# Patient Record
Sex: FEMALE | Race: WHITE | NOT HISPANIC OR LATINO | Employment: OTHER | URBAN - METROPOLITAN AREA
[De-identification: names, ages, dates, MRNs, and addresses within clinical notes are randomized per-mention and may not be internally consistent; named-entity substitution may affect disease eponyms.]

---

## 2023-06-01 ENCOUNTER — OFFICE VISIT (OUTPATIENT)
Dept: PRIMARY CARE | Facility: CLINIC | Age: 68
End: 2023-06-01
Payer: MEDICARE

## 2023-06-01 VITALS
HEIGHT: 68 IN | BODY MASS INDEX: 18.94 KG/M2 | DIASTOLIC BLOOD PRESSURE: 70 MMHG | SYSTOLIC BLOOD PRESSURE: 170 MMHG | WEIGHT: 125 LBS | TEMPERATURE: 97.5 F

## 2023-06-01 DIAGNOSIS — Z12.31 ENCOUNTER FOR SCREENING MAMMOGRAM FOR BREAST CANCER: ICD-10-CM

## 2023-06-01 DIAGNOSIS — Z00.00 ROUTINE GENERAL MEDICAL EXAMINATION AT HEALTH CARE FACILITY: Primary | ICD-10-CM

## 2023-06-01 DIAGNOSIS — Z78.0 MENOPAUSE: ICD-10-CM

## 2023-06-01 LAB
ALANINE AMINOTRANSFERASE (SGPT) (U/L) IN SER/PLAS: 17 U/L (ref 7–45)
ALBUMIN (G/DL) IN SER/PLAS: 4.2 G/DL (ref 3.4–5)
ALKALINE PHOSPHATASE (U/L) IN SER/PLAS: 68 U/L (ref 33–136)
ANION GAP IN SER/PLAS: 12 MMOL/L (ref 10–20)
ASPARTATE AMINOTRANSFERASE (SGOT) (U/L) IN SER/PLAS: 25 U/L (ref 9–39)
BILIRUBIN TOTAL (MG/DL) IN SER/PLAS: 0.7 MG/DL (ref 0–1.2)
CALCIUM (MG/DL) IN SER/PLAS: 9.6 MG/DL (ref 8.6–10.6)
CARBON DIOXIDE, TOTAL (MMOL/L) IN SER/PLAS: 29 MMOL/L (ref 21–32)
CHLORIDE (MMOL/L) IN SER/PLAS: 104 MMOL/L (ref 98–107)
CHOLESTEROL (MG/DL) IN SER/PLAS: 254 MG/DL (ref 0–199)
CHOLESTEROL IN HDL (MG/DL) IN SER/PLAS: 87.2 MG/DL
CHOLESTEROL/HDL RATIO: 2.9
CREATININE (MG/DL) IN SER/PLAS: 0.76 MG/DL (ref 0.5–1.05)
ESTIMATED AVERAGE GLUCOSE FOR HBA1C: 111 MG/DL
GFR FEMALE: 84 ML/MIN/1.73M2
GLUCOSE (MG/DL) IN SER/PLAS: 90 MG/DL (ref 74–99)
HEMOGLOBIN A1C/HEMOGLOBIN TOTAL IN BLOOD: 5.5 %
LDL: 152 MG/DL (ref 0–99)
POTASSIUM (MMOL/L) IN SER/PLAS: 4.1 MMOL/L (ref 3.5–5.3)
PROTEIN TOTAL: 7.1 G/DL (ref 6.4–8.2)
SODIUM (MMOL/L) IN SER/PLAS: 141 MMOL/L (ref 136–145)
TRIGLYCERIDE (MG/DL) IN SER/PLAS: 72 MG/DL (ref 0–149)
UREA NITROGEN (MG/DL) IN SER/PLAS: 13 MG/DL (ref 6–23)
VLDL: 14 MG/DL (ref 0–40)

## 2023-06-01 PROCEDURE — 1170F FXNL STATUS ASSESSED: CPT | Performed by: FAMILY MEDICINE

## 2023-06-01 PROCEDURE — G0439 PPPS, SUBSEQ VISIT: HCPCS | Performed by: FAMILY MEDICINE

## 2023-06-01 PROCEDURE — 83036 HEMOGLOBIN GLYCOSYLATED A1C: CPT

## 2023-06-01 PROCEDURE — 80061 LIPID PANEL: CPT

## 2023-06-01 PROCEDURE — 1036F TOBACCO NON-USER: CPT | Performed by: FAMILY MEDICINE

## 2023-06-01 PROCEDURE — 84443 ASSAY THYROID STIM HORMONE: CPT

## 2023-06-01 PROCEDURE — 80053 COMPREHEN METABOLIC PANEL: CPT

## 2023-06-01 PROCEDURE — 99397 PER PM REEVAL EST PAT 65+ YR: CPT | Performed by: FAMILY MEDICINE

## 2023-06-01 ASSESSMENT — ENCOUNTER SYMPTOMS
CONSTIPATION: 0
SHORTNESS OF BREATH: 0
LOSS OF SENSATION IN FEET: 0
VOICE CHANGE: 0
COUGH: 0
MYALGIAS: 0
SLEEP DISTURBANCE: 0
FEVER: 0
ABDOMINAL PAIN: 0
NAUSEA: 0
HEMATURIA: 0
ARTHRALGIAS: 0
WEAKNESS: 0
ROS SKIN COMMENTS: NO MOLES GROWING OR CHANGING.
WOUND: 0
VOMITING: 0
FATIGUE: 0
DEPRESSION: 0
DYSPHORIC MOOD: 0
FREQUENCY: 0
DIARRHEA: 0
RHINORRHEA: 0
PALPITATIONS: 0
OCCASIONAL FEELINGS OF UNSTEADINESS: 0
ADENOPATHY: 0
BACK PAIN: 0
NERVOUS/ANXIOUS: 0
HEADACHES: 0
WHEEZING: 0
SINUS PRESSURE: 0
BLOOD IN STOOL: 0
SORE THROAT: 0
NUMBNESS: 0

## 2023-06-01 ASSESSMENT — ACTIVITIES OF DAILY LIVING (ADL)
DOING_HOUSEWORK: INDEPENDENT
MANAGING_FINANCES: INDEPENDENT
GROCERY_SHOPPING: INDEPENDENT
BATHING: INDEPENDENT
DRESSING: INDEPENDENT
TAKING_MEDICATION: INDEPENDENT

## 2023-06-01 ASSESSMENT — PATIENT HEALTH QUESTIONNAIRE - PHQ9
1. LITTLE INTEREST OR PLEASURE IN DOING THINGS: NOT AT ALL
2. FEELING DOWN, DEPRESSED OR HOPELESS: NOT AT ALL
SUM OF ALL RESPONSES TO PHQ9 QUESTIONS 1 AND 2: 0

## 2023-06-01 NOTE — PROGRESS NOTES
"Subjective   Reason for Visit: Jeana Gatica is an 67 y.o. female here for a Medicare Wellness visit.     Past Medical, Surgical, and Family History reviewed and updated in chart.         HPI  Had a fall six weeks ago.  Tripped over a in the garden.  She broke her left wrist.  Seeing Dr Sinclair.  Cast was just removed yesterday.  Radial head fracture.  In a splint.  This occurred six weeks ago.    She states she had a colonoscopy nine years ago.  It was normal  She was told to come back in ten years.  Patient Care Team:  Aldo Rooney DO as PCP - General  Aldo Rooney DO as PCP - [employer]vantage PCP   Has advanced directives.  Has brought them in for the chart.  Review of Systems   Constitutional:  Negative for fatigue and fever.   HENT:  Negative for rhinorrhea, sinus pressure, sore throat and voice change.    Respiratory:  Negative for cough, shortness of breath and wheezing.    Cardiovascular:  Negative for chest pain, palpitations and leg swelling.   Gastrointestinal:  Negative for abdominal pain, blood in stool, constipation, diarrhea, nausea and vomiting.   Genitourinary:  Negative for frequency, hematuria and vaginal bleeding.   Musculoskeletal:  Negative for arthralgias, back pain and myalgias.   Skin:  Negative for rash and wound.        No moles growing or changing.   Neurological:  Negative for syncope, weakness, numbness and headaches.   Hematological:  Negative for adenopathy.   Psychiatric/Behavioral:  Negative for dysphoric mood, self-injury, sleep disturbance and suicidal ideas. The patient is not nervous/anxious.         No anhedonia.       Objective   Vitals:  /70 (BP Location: Left arm, Patient Position: Sitting)   Temp 36.4 °C (97.5 °F)   Ht 1.727 m (5' 8\")   Wt 56.7 kg (125 lb)   BMI 19.01 kg/m²       Physical Exam  Vitals reviewed.   Constitutional:       General: She is not in acute distress.     Appearance: Normal appearance. She is not ill-appearing or " toxic-appearing.   HENT:      Head: Normocephalic and atraumatic.      Right Ear: Tympanic membrane, ear canal and external ear normal.      Left Ear: Tympanic membrane, ear canal and external ear normal.      Nose: Nose normal.      Mouth/Throat:      Mouth: Mucous membranes are moist.   Eyes:      Extraocular Movements: Extraocular movements intact.      Conjunctiva/sclera: Conjunctivae normal.      Pupils: Pupils are equal, round, and reactive to light.   Cardiovascular:      Rate and Rhythm: Normal rate and regular rhythm.      Heart sounds: No murmur heard.  Pulmonary:      Effort: Pulmonary effort is normal.      Breath sounds: Normal breath sounds.   Abdominal:      General: Bowel sounds are normal. There is no distension.      Palpations: Abdomen is soft. There is no mass.      Tenderness: There is no abdominal tenderness. There is no guarding or rebound.   Musculoskeletal:         General: No tenderness.      Cervical back: Neck supple.      Right lower leg: No edema.      Left lower leg: No edema.   Skin:     Coloration: Skin is not jaundiced or pale.      Findings: No rash.   Neurological:      General: No focal deficit present.      Mental Status: She is alert and oriented to person, place, and time. Mental status is at baseline.      Motor: No weakness.      Coordination: Coordination normal.      Gait: Gait normal.   Psychiatric:         Mood and Affect: Mood normal.         Behavior: Behavior normal.         Thought Content: Thought content normal.         Judgment: Judgment normal.         Assessment/Plan   Problem List Items Addressed This Visit    None  Visit Diagnoses       Routine general medical examination at health care facility    -  Primary    Relevant Orders    1 Year Follow Up In Primary Care - Wellness Exam    Hemoglobin A1C    Comprehensive Metabolic Panel    Lipid Panel    Thyroid Stimulating Hormone    Encounter for screening mammogram for breast cancer        Relevant Orders      mammo bilateral screening tomosynthesis    Menopause        Relevant Orders    XR DEXA bone density          Annual Wellness exam completed   Preventive Health history reviewed:  Labs ordered    Mammogram ordered  BMD done September 2021.  Cscope due next year.  Pt states had a colonoscopy that was normal at age 58/  Low dose CT chest for lung cancer screening not indicated.  Never a smoker.  Depression Screening done  PAP done--normal--in 2020.  Advanced Directives Discussion Completed  Cardiovascular risk discussed and if needed, lifestyle modifications recommended, including nutritional choices, exercise, and elimination of habits contributing to risk.  We agreed on a plan to reduce the current cardiovascular risk.  See ecalc ASCVD Risk  Plus for data discussed regarding risk and risk reduction opportunities.  Aspirin use not needed.  Continue maintaining a healthy diet.  She grows her own garden and is very active doing this.   Vaccines:  Influenza  did not get.  Presently out of season.  Prevnar 13 done 2017  Pneumovax 23 done 2019  Shingrix done 2019  Your exam was normal today.  You had one fall which is concerning but that was a trip over a root when you were out gardening.  Follow up with the bone density scan in September.  Follow up with the mammogram now.  You will be due for a colonoscopy next year.  Labs were ordered today.  Continue to maintain a good healthy diet rich in fruits and vegetables.  Return in one year.  I recommend a flu shot in the fall.

## 2023-06-01 NOTE — PATIENT INSTRUCTIONS
Your exam was normal today.  You had one fall which is concerning but that was a trip over a root when you were out gardening.  Follow up with the bone density scan in September.  Follow up with the mammogram now.  You will be due for a colonoscopy next year.  Labs were ordered today.  Continue to maintain a good healthy diet rich in fruits and vegetables.  Return in one year.  I recommend a flu shot in the fall.

## 2023-06-02 ENCOUNTER — TELEPHONE (OUTPATIENT)
Dept: PRIMARY CARE | Facility: CLINIC | Age: 68
End: 2023-06-02
Payer: MEDICARE

## 2023-06-02 LAB — THYROTROPIN (MIU/L) IN SER/PLAS BY DETECTION LIMIT <= 0.05 MIU/L: 2.31 MIU/L (ref 0.44–3.98)

## 2023-06-02 NOTE — TELEPHONE ENCOUNTER
"----- Message from Aldo Rooney DO sent at 6/1/2023  8:28 PM EDT -----  Please let her know her labs showed no significant problems.  Her LDL or \"bad cholesterol\" was somewhat high but also her HDL or \"good cholesterol\" was high.  The ratio of total cholesterol divided by the good cholesterol was at a normal level.  I recommend that she continue to maintain a healthy diet rich in fruits and vegetables.  Cut back starches and increase exercise.    "

## 2023-06-15 ENCOUNTER — TELEPHONE (OUTPATIENT)
Dept: PRIMARY CARE | Facility: CLINIC | Age: 68
End: 2023-06-15
Payer: MEDICARE

## 2023-06-15 NOTE — TELEPHONE ENCOUNTER
----- Message from Aldo Rooney DO sent at 6/14/2023 10:12 PM EDT -----  Please let her know her mammogram was normal.  This should be rechecked in one year.

## 2023-09-29 DIAGNOSIS — M85.80 OSTEOPENIA, UNSPECIFIED LOCATION: Primary | ICD-10-CM

## 2023-09-29 RX ORDER — CALCIUM CARBONATE 600 MG
2 TABLET ORAL
Qty: 180 TABLET | Refills: 1 | Status: SHIPPED | OUTPATIENT
Start: 2023-09-29

## 2023-09-29 NOTE — TELEPHONE ENCOUNTER
Discussed with patient.  She states that she doesn't feel comfortable with OTC supplements and prefers that the Calcium be sent to her pharmacy on file.

## 2023-09-29 NOTE — TELEPHONE ENCOUNTER
Aldo Rooney, DO to [facility] [place] [id number] Clinical Support Staff    Please let her know her bone density scan showed low bone mass or osteopenia but no osteoporosis.  I recommend that she take calcium 600 mg twice a day and increase exercise.

## 2024-02-02 ENCOUNTER — APPOINTMENT (OUTPATIENT)
Dept: DERMATOLOGY | Facility: CLINIC | Age: 69
End: 2024-02-02
Payer: COMMERCIAL

## 2024-06-03 ENCOUNTER — OFFICE VISIT (OUTPATIENT)
Dept: PRIMARY CARE | Facility: CLINIC | Age: 69
End: 2024-06-03
Payer: COMMERCIAL

## 2024-06-03 VITALS
TEMPERATURE: 97.3 F | BODY MASS INDEX: 19.55 KG/M2 | HEIGHT: 68 IN | SYSTOLIC BLOOD PRESSURE: 132 MMHG | WEIGHT: 129 LBS | DIASTOLIC BLOOD PRESSURE: 80 MMHG

## 2024-06-03 DIAGNOSIS — Z00.00 ROUTINE GENERAL MEDICAL EXAMINATION AT HEALTH CARE FACILITY: Primary | ICD-10-CM

## 2024-06-03 DIAGNOSIS — M85.80 OSTEOPENIA, UNSPECIFIED LOCATION: ICD-10-CM

## 2024-06-03 LAB
ALBUMIN SERPL BCP-MCNC: 4.4 G/DL (ref 3.4–5)
ALP SERPL-CCNC: 76 U/L (ref 33–136)
ALT SERPL W P-5'-P-CCNC: 22 U/L (ref 7–45)
ANION GAP SERPL CALC-SCNC: 12 MMOL/L (ref 10–20)
APPEARANCE UR: CLEAR
AST SERPL W P-5'-P-CCNC: 27 U/L (ref 9–39)
BASOPHILS # BLD AUTO: 0.05 X10*3/UL (ref 0–0.1)
BASOPHILS NFR BLD AUTO: 1.3 %
BILIRUB SERPL-MCNC: 0.5 MG/DL (ref 0–1.2)
BILIRUB UR STRIP.AUTO-MCNC: NEGATIVE MG/DL
BUN SERPL-MCNC: 14 MG/DL (ref 6–23)
CALCIUM SERPL-MCNC: 9.4 MG/DL (ref 8.6–10.3)
CHLORIDE SERPL-SCNC: 104 MMOL/L (ref 98–107)
CHOLEST SERPL-MCNC: 241 MG/DL (ref 0–199)
CHOLESTEROL/HDL RATIO: 2.8
CO2 SERPL-SCNC: 30 MMOL/L (ref 21–32)
COLOR UR: YELLOW
CREAT SERPL-MCNC: 0.98 MG/DL (ref 0.5–1.05)
EGFRCR SERPLBLD CKD-EPI 2021: 62 ML/MIN/1.73M*2
EOSINOPHIL # BLD AUTO: 0.15 X10*3/UL (ref 0–0.7)
EOSINOPHIL NFR BLD AUTO: 3.8 %
ERYTHROCYTE [DISTWIDTH] IN BLOOD BY AUTOMATED COUNT: 12.5 % (ref 11.5–14.5)
GLUCOSE SERPL-MCNC: 102 MG/DL (ref 74–99)
GLUCOSE UR STRIP.AUTO-MCNC: NEGATIVE MG/DL
HCT VFR BLD AUTO: 42.6 % (ref 36–46)
HDLC SERPL-MCNC: 85.3 MG/DL
HGB BLD-MCNC: 13.7 G/DL (ref 12–16)
IMM GRANULOCYTES # BLD AUTO: 0.01 X10*3/UL (ref 0–0.7)
IMM GRANULOCYTES NFR BLD AUTO: 0.3 % (ref 0–0.9)
KETONES UR STRIP.AUTO-MCNC: NEGATIVE MG/DL
LDLC SERPL CALC-MCNC: 145 MG/DL
LEUKOCYTE ESTERASE UR QL STRIP.AUTO: NEGATIVE
LYMPHOCYTES # BLD AUTO: 1 X10*3/UL (ref 1.2–4.8)
LYMPHOCYTES NFR BLD AUTO: 25.1 %
MCH RBC QN AUTO: 30 PG (ref 26–34)
MCHC RBC AUTO-ENTMCNC: 32.2 G/DL (ref 32–36)
MCV RBC AUTO: 93 FL (ref 80–100)
MONOCYTES # BLD AUTO: 0.38 X10*3/UL (ref 0.1–1)
MONOCYTES NFR BLD AUTO: 9.5 %
NEUTROPHILS # BLD AUTO: 2.39 X10*3/UL (ref 1.2–7.7)
NEUTROPHILS NFR BLD AUTO: 60 %
NITRITE UR QL STRIP.AUTO: NEGATIVE
NON HDL CHOLESTEROL: 156 MG/DL (ref 0–149)
NRBC BLD-RTO: 0 /100 WBCS (ref 0–0)
PH UR STRIP.AUTO: 6 [PH]
PLATELET # BLD AUTO: 303 X10*3/UL (ref 150–450)
POTASSIUM SERPL-SCNC: 4.5 MMOL/L (ref 3.5–5.3)
PROT SERPL-MCNC: 6.9 G/DL (ref 6.4–8.2)
PROT UR STRIP.AUTO-MCNC: NEGATIVE MG/DL
RBC # BLD AUTO: 4.57 X10*6/UL (ref 4–5.2)
RBC # UR STRIP.AUTO: NEGATIVE /UL
SODIUM SERPL-SCNC: 141 MMOL/L (ref 136–145)
SP GR UR STRIP.AUTO: 1.02
TRIGL SERPL-MCNC: 56 MG/DL (ref 0–149)
TSH SERPL-ACNC: 2.69 MIU/L (ref 0.44–3.98)
UROBILINOGEN UR STRIP.AUTO-MCNC: <2 MG/DL
VLDL: 11 MG/DL (ref 0–40)
WBC # BLD AUTO: 4 X10*3/UL (ref 4.4–11.3)

## 2024-06-03 PROCEDURE — 1159F MED LIST DOCD IN RCRD: CPT | Performed by: FAMILY MEDICINE

## 2024-06-03 PROCEDURE — 80061 LIPID PANEL: CPT

## 2024-06-03 PROCEDURE — 83036 HEMOGLOBIN GLYCOSYLATED A1C: CPT

## 2024-06-03 PROCEDURE — G0439 PPPS, SUBSEQ VISIT: HCPCS | Performed by: FAMILY MEDICINE

## 2024-06-03 PROCEDURE — 84443 ASSAY THYROID STIM HORMONE: CPT

## 2024-06-03 PROCEDURE — 36415 COLL VENOUS BLD VENIPUNCTURE: CPT

## 2024-06-03 PROCEDURE — 85025 COMPLETE CBC W/AUTO DIFF WBC: CPT

## 2024-06-03 PROCEDURE — 1160F RVW MEDS BY RX/DR IN RCRD: CPT | Performed by: FAMILY MEDICINE

## 2024-06-03 PROCEDURE — 1036F TOBACCO NON-USER: CPT | Performed by: FAMILY MEDICINE

## 2024-06-03 PROCEDURE — 80053 COMPREHEN METABOLIC PANEL: CPT

## 2024-06-03 PROCEDURE — 81003 URINALYSIS AUTO W/O SCOPE: CPT

## 2024-06-03 PROCEDURE — 1170F FXNL STATUS ASSESSED: CPT | Performed by: FAMILY MEDICINE

## 2024-06-03 RX ORDER — LANOLIN ALCOHOL/MO/W.PET/CERES
1 CREAM (GRAM) TOPICAL DAILY
COMMUNITY
Start: 2015-06-23

## 2024-06-03 RX ORDER — CHOLECALCIFEROL (VITAMIN D3) 25 MCG
TABLET ORAL
COMMUNITY

## 2024-06-03 ASSESSMENT — ENCOUNTER SYMPTOMS
VOICE CHANGE: 0
OCCASIONAL FEELINGS OF UNSTEADINESS: 0
ARTHRALGIAS: 0
SINUS PRESSURE: 0
ADENOPATHY: 0
CONSTIPATION: 0
FATIGUE: 0
WEAKNESS: 0
BACK PAIN: 0
LOSS OF SENSATION IN FEET: 0
WHEEZING: 0
MYALGIAS: 0
VOMITING: 0
FREQUENCY: 0
HEMATURIA: 0
DYSURIA: 0
ROS SKIN COMMENTS: NO MOLES GROWING OR CHANGING.
COUGH: 0
NUMBNESS: 0
PALPITATIONS: 0
FEVER: 0
WOUND: 0
BLOOD IN STOOL: 0
DIZZINESS: 0
RHINORRHEA: 0
SHORTNESS OF BREATH: 0
DEPRESSION: 0
SORE THROAT: 0
HEADACHES: 0
DYSPHORIC MOOD: 0
NERVOUS/ANXIOUS: 0
DIARRHEA: 0
NAUSEA: 0
ABDOMINAL PAIN: 0
SLEEP DISTURBANCE: 0

## 2024-06-03 ASSESSMENT — ACTIVITIES OF DAILY LIVING (ADL)
DRESSING: INDEPENDENT
DOING_HOUSEWORK: INDEPENDENT
GROCERY_SHOPPING: INDEPENDENT
MANAGING_FINANCES: INDEPENDENT
BATHING: INDEPENDENT
TAKING_MEDICATION: INDEPENDENT

## 2024-06-03 ASSESSMENT — PATIENT HEALTH QUESTIONNAIRE - PHQ9
2. FEELING DOWN, DEPRESSED OR HOPELESS: NOT AT ALL
SUM OF ALL RESPONSES TO PHQ9 QUESTIONS 1 AND 2: 0
1. LITTLE INTEREST OR PLEASURE IN DOING THINGS: NOT AT ALL

## 2024-06-03 NOTE — PROGRESS NOTES
Subjective   Reason for Visit: Jeana Gatica is an 68 y.o. female here for a Medicare Wellness visit.          Reviewed all medications by prescribing practitioner or clinical pharmacist (such as prescriptions, OTCs, herbal therapies and supplements) and documented in the medical record.    Current Outpatient Medications on File Prior to Visit   Medication Sig Dispense Refill    calcium carbonate-vit D3-min (Calcium 600 + Minerals) 600 mg calcium- 200 unit tablet Take 2 tablets by mouth once every day. 180 tablet 1    cholecalciferol (Vitamin D-3) 25 MCG (1000 UT) tablet       cyanocobalamin (Vitamin B-12) 1,000 mcg tablet Take 1 tablet (1,000 mcg) by mouth once daily.      multivitamin-Ca-iron-minerals (Tab-A-Crista Womens) 27-0.4 mg tablet Take by mouth.       No current facility-administered medications on file prior to visit.   No surgeries or hospitalizations in the last year.    Advance care planning discussed with patient.  She is not able to name a healthcare agent at this time.      Tobacco Use: Low Risk  (6/3/2024)    Patient History     Smoking Tobacco Use: Never     Smokeless Tobacco Use: Never     Passive Exposure: Never   No alcohol or drugs.    Has advanced directives.  Full code.  POA would daughter Tevin Larios.  She thinks she brought them in three years ago.      Patient Care Team:  Aldo Rooney DO as PCP - General  Aldo Rooney DO as PCP - [employer] Medicare Advantage PCP     Review of Systems   Constitutional:  Negative for fatigue and fever.   HENT:  Negative for rhinorrhea, sinus pressure, sore throat and voice change.    Respiratory:  Negative for cough, shortness of breath and wheezing.    Cardiovascular:  Negative for chest pain, palpitations and leg swelling.   Gastrointestinal:  Negative for abdominal pain, blood in stool, constipation, diarrhea, nausea and vomiting.   Genitourinary:  Negative for dysuria, frequency, hematuria and vaginal bleeding.   Musculoskeletal:   "Negative for arthralgias, back pain and myalgias.   Skin:  Negative for rash and wound.        No moles growing or changing.   Neurological:  Negative for dizziness, syncope, weakness, numbness and headaches.   Hematological:  Negative for adenopathy.   Psychiatric/Behavioral:  Negative for dysphoric mood, self-injury, sleep disturbance and suicidal ideas. The patient is not nervous/anxious.        Objective   Vitals:  /80 (BP Location: Right arm, Patient Position: Sitting)   Temp 36.3 °C (97.3 °F) (Skin)   Ht 1.721 m (5' 7.75\")   Wt 58.5 kg (129 lb)   BMI 19.76 kg/m²       Physical Exam  Vitals reviewed.   Constitutional:       General: She is not in acute distress.     Appearance: Normal appearance. She is not ill-appearing or toxic-appearing.   HENT:      Head: Normocephalic and atraumatic.      Right Ear: Tympanic membrane, ear canal and external ear normal.      Left Ear: Tympanic membrane, ear canal and external ear normal.      Nose: Nose normal.      Mouth/Throat:      Mouth: Mucous membranes are moist.   Eyes:      Extraocular Movements: Extraocular movements intact.      Conjunctiva/sclera: Conjunctivae normal.      Pupils: Pupils are equal, round, and reactive to light.   Cardiovascular:      Rate and Rhythm: Normal rate and regular rhythm.      Heart sounds: No murmur heard.  Pulmonary:      Effort: Pulmonary effort is normal.      Breath sounds: Normal breath sounds.   Abdominal:      General: Bowel sounds are normal. There is no distension.      Palpations: Abdomen is soft. There is no mass.      Tenderness: There is no abdominal tenderness. There is no guarding or rebound.   Musculoskeletal:         General: No tenderness.      Cervical back: Neck supple.      Right lower leg: No edema.      Left lower leg: No edema.   Skin:     Coloration: Skin is not jaundiced or pale.      Findings: No rash.   Neurological:      General: No focal deficit present.      Mental Status: She is alert and " oriented to person, place, and time. Mental status is at baseline.   Psychiatric:         Mood and Affect: Mood normal.         Behavior: Behavior normal.         Thought Content: Thought content normal.         Judgment: Judgment normal.         Assessment/Plan   Problem List Items Addressed This Visit    None  Visit Diagnoses       Routine general medical examination at health care facility    -  Primary    Relevant Orders    Hemoglobin A1C    Comprehensive Metabolic Panel    Lipid Panel    Thyroid Stimulating Hormone    Urinalysis with Reflex Microscopic    Osteopenia, unspecified location        Relevant Orders    CBC and Auto Differential          Annual Wellness exam completed   Preventive Health history reviewed:  Labs ordered    Mammogram ordered.  Last mammo 6/23, normal.  BMD done 6/2023.  Cscope done 7/2015.  Normal.  Repeat in ten years was recommended.  Low dose CT chest for lung cancer screening not indicated.  Never a smoker.  Depression Screening done  Advanced Directives Discussion Completed  Cardiovascular risk discussed and if needed, lifestyle modifications recommended, including nutritional choices, exercise, and elimination of habits contributing to risk.  We agreed on a plan to reduce the current cardiovascular risk.  See ecalc ASCVD Risk  Plus for data discussed regarding risk and risk reduction opportunities.  Aspirin use is not recommended after reviewing the updated guidelines.   Vaccines:  Influenza declines.  Prevnar 20  states she has had this in the past.  Prevnar 13 states she has had this in the past.  Pneumovax 23 she states she has had this in the past.  Shingrix she had this at [facility] five years ago.  I will order labs today.  Continue to maintain a healthy weight.  Continue with regular exercise and maintaining a garden.  Return in one year or sooner if there are any new or worsened problems.

## 2024-06-03 NOTE — PATIENT INSTRUCTIONS
I will order labs today.  Continue to maintain a healthy weight.  Continue with regular exercise and maintaining a garden.  Return in one year or sooner if there are any new or worsened problems.

## 2024-06-04 LAB
EST. AVERAGE GLUCOSE BLD GHB EST-MCNC: 111 MG/DL
HBA1C MFR BLD: 5.5 %

## 2025-03-28 ENCOUNTER — OFFICE VISIT (OUTPATIENT)
Dept: ORTHOPEDIC SURGERY | Facility: HOSPITAL | Age: 70
End: 2025-03-28
Payer: MEDICARE

## 2025-03-28 ENCOUNTER — HOSPITAL ENCOUNTER (OUTPATIENT)
Dept: RADIOLOGY | Facility: HOSPITAL | Age: 70
Discharge: HOME | End: 2025-03-28
Payer: MEDICARE

## 2025-03-28 DIAGNOSIS — M76.892 PES ANSERINUS TENDONITIS OF LEFT LOWER EXTREMITY: ICD-10-CM

## 2025-03-28 DIAGNOSIS — M17.12 PRIMARY OSTEOARTHRITIS OF LEFT KNEE: Primary | ICD-10-CM

## 2025-03-28 DIAGNOSIS — M25.562 LEFT KNEE PAIN, UNSPECIFIED CHRONICITY: ICD-10-CM

## 2025-03-28 PROCEDURE — 73564 X-RAY EXAM KNEE 4 OR MORE: CPT | Mod: LT

## 2025-03-28 PROCEDURE — 1125F AMNT PAIN NOTED PAIN PRSNT: CPT | Performed by: FAMILY MEDICINE

## 2025-03-28 PROCEDURE — 99214 OFFICE O/P EST MOD 30 MIN: CPT | Performed by: FAMILY MEDICINE

## 2025-03-28 ASSESSMENT — PAIN - FUNCTIONAL ASSESSMENT: PAIN_FUNCTIONAL_ASSESSMENT: 0-10

## 2025-03-28 ASSESSMENT — PAIN DESCRIPTION - DESCRIPTORS: DESCRIPTORS: BURNING;DISCOMFORT;SORE;TENDER

## 2025-03-28 ASSESSMENT — PAIN SCALES - GENERAL: PAINLEVEL_OUTOF10: 6

## 2025-04-04 ENCOUNTER — EVALUATION (OUTPATIENT)
Dept: PHYSICAL THERAPY | Facility: CLINIC | Age: 70
End: 2025-04-04
Payer: MEDICARE

## 2025-04-04 DIAGNOSIS — M76.892 PES ANSERINUS TENDONITIS OF LEFT LOWER EXTREMITY: ICD-10-CM

## 2025-04-04 DIAGNOSIS — M17.12 PRIMARY OSTEOARTHRITIS OF LEFT KNEE: ICD-10-CM

## 2025-04-04 PROCEDURE — 97161 PT EVAL LOW COMPLEX 20 MIN: CPT | Mod: GP | Performed by: PHYSICAL THERAPIST

## 2025-04-04 PROCEDURE — 97110 THERAPEUTIC EXERCISES: CPT | Mod: GP | Performed by: PHYSICAL THERAPIST

## 2025-04-04 ASSESSMENT — PAIN DESCRIPTION - DESCRIPTORS: DESCRIPTORS: BURNING

## 2025-04-04 ASSESSMENT — PAIN SCALES - GENERAL: PAINLEVEL_OUTOF10: 1

## 2025-04-04 ASSESSMENT — PAIN - FUNCTIONAL ASSESSMENT: PAIN_FUNCTIONAL_ASSESSMENT: 0-10

## 2025-04-04 NOTE — PROGRESS NOTES
Physical Therapy  Physical Therapy Evaluation and Treatment      Patient Name: Jeana Gatica  MRN: [id number]  Today's Date: 2025    Time Entry: Start time: 1123            End time: 1214     Reason for Visit  Referred Dx:Primary osteoarthritis of left knee [M17.12], Pes anserinus tendonitis of left lower extremity [M76.892]   Referred By:  Abelardo Sinclair MD    Insurance Information  Visit #: 1  Approved Visits:  MED NEC   Auth required:  / NO AUTH   [place] [place]ification Date Range: 2025 to 7/3/2025    : verified with patient    Subjective    Chief complaint: Pain in her L knee, difficulty doing stairs ( going up is more diff kee down, preferes fogoing down with R and up with L),  pain after prolonged stand, limited doing her exs such as squats, lunges due to pain  Onset: 5 wks  BARBARA: may be from the exs routine that she does every day s  [place]:The patient reports experiencing pain on the inner side of her left knee and saw Dr. Sinclair for this issue. She believes the pain may be related to her regular exercise routine. The pain occurs intermittently. Dr. Sinclair ordered an X-ray and diagnosed her with tendinitis along with some arthritis. Doc prescribed Tylenol for pain relief and referred her to physical therapy.   X-ray: () findings - L knee:  Mild osteoarthritis left knee. No evidence of fracture or malalignment  PMHx: Not available on my chart, Patient reports p/h/o B wrist sx and PT after that.   Barriers to the treatment: N/A  PLOF: independent without restrictions  Medications: see chart for full medication list   Medical Screening: Patient denies bowel/bladder changes, pulsatile mass in abdomen, recent infection/illness, calf pain, headaches, chest pain, SOB, redness/warmth/swelling in LE   Functional Limitations: The patient presents with difficulty performing stair climbing (up > down) and has trouble with exercises that require knee bending and straightening, such as  squats and lunges    Patient goals for PT: The patient states that she wants to perform functions, including climbing stairs and exercises she used to do, such as squats and lunges, without pain or limitation in her knee.   Precautions:  Precautions  STEADI Fall Risk Score (The score of 4 or more indicates an increased risk of falling): 0  Precautions Comment: Low fall risk  Pain:  Pain Assessment  Pain Assessment: 0-10  0-10 (Numeric) Pain Score: 1  Pain Location: Knee  Pain Orientation: Left, Inner  Pain Descriptors: Burning  Pain Frequency: Intermittent  Pain Onset: Sudden  Clinical Progression: Gradually worsening  Patient's Stated Pain Goal: No pain  Pain Interventions: Medication (See MAR), Cold pack, Rest, Other (Comment)  At Best: 0-1 /10  At Worst: 5/10  Aggravating Factors: prolonged standing and pain in the middle of the night with no activity  Relieving factors: Rest, Motrin, Ice  Home Living:  Home Living  Home Living Comment: The patient stats that she lives in a ranch style house with the basement and laundry in the basement ,  helps with the laundry because of pain in her knee while doing stairs.  Prior Level of Function:  Prior Function Per Pt/Caregiver Report  Level of [place]: Independent with ADLs and functional transfers    Objective   Observation:  Posture: The patient stands with pelvic shift to R side with wt bearing ion RLE, guarded posture  Palpation:   TTP: Present med knee joint line B knees ( L>R) , L tibial tuberosity and L med tibial condyle   Spasm/Tightness: Hamstrings, piriformis, achillis and hip adductors  Functional Performance:   DL Squat: 110* hip flexion, 90* knee flexion, increased wt bearing on RLE , increased trunk flexion   SL Squat: increased valgus force , unsteady,one hand support req B side   SLS: R: 9s , high guard and unsteady, L: 18s, high guard, unsteady   5x STS: 11s, increased wt bearing on RLE while performing it   Step up/down: Able to step up B  side , some pain and guarded posture noted on B side   Lateral step down: Hip abd weakness and decreased knee control noted  ROM  Knee ROM:              Flexion: R: wnl  L: 130 (painful)              Extension:R: wnl  L: wfl (painful)   Hip ROM:              Flexion: R:0 - 100*, L:0 -100*               Extension: R:0 -10* L:0 -10*              IR(90*): R:0 - 30* L:0- 30*              ER(90*): R:0- 40* L: 0- 30*              Abd: R: 0-40*, L; 0-40*   MMT:              Knee extension:R:4/5 L:4/5               Knee Flexion: R:4/5 L:4/5               Hip Flexion: R:4/5 L:4/5                Prone Hip Extension: R:4-/5 L:4-/5               Hip IR(90*): R:4/5 L:4/5               Hip ER(90*)R:4/5 L:4/5               Hip Abduction: R:4-/5 L:4-/5    Gluteus medius: R:3+/5 L:3+/5                Hip Adduction: R:4/5 L:4/5    Neuro Exam:  Dermatome exam: Normal  Myotome exam: Normal  Reflexes: 2+  Special Tests:              Varus/Valgus stress test: Valgus: (-)              Patellar grinding: painful , B side (R>L)  Joint Play Assessment: Decreased lat patellar mob - B knees (L>R)  Outcome Measures:  Other Measures  Lower Extremity Funtional Score (LEFS): 56/80 =70% = min - mod functional limitations     Assessment:  A 68 y/o female patient presents with chief complaint of L knee pain that has been present since last 5 weeks. Patient notes that stair climbing and exs that required knee bending and squatting tends to aggravate her sxs. Patient demonstrates decreased knee ROM, decreased knee/hip strength, decreased functional strength, decreased dynamic knee control, impaired gait quality, and increased knee pain, which limits her functional ability. Increased weakness noted on R knee compared to L side while performing while doing functional performing test with decreased knee control standing on one leg an performing squats. Patient would likely benefit from skilled outpatient PT in order to address the above deficits and  return to PLOF.     Plan:  OP PT Plan  Treatment/Interventions: Aquatic therapy, Cryotherapy, Dry needling, Education/ Instruction, Gait training, Hot pack, Manual therapy, Neuromuscular re-education, Taping techniques, Therapeutic activities, Therapeutic exercises, Ultrasound, Other (comment)  PT Plan: Skilled PT  PT Frequency: 1 time per week  Duration: 8-10 wks  Onset Date: 02/28/25  Certification Period Start Date: 04/04/25  Certification Period End Date: 07/03/25  Number of Treatments Authorized: MED NEC  Rehab Potential: Good  Plan of Care Agreement: Patient   EDUCATION:  Outpatient Education  Individual(s) Educated: Patient  Education Provided: Body Mechanics, Home Exercise Program, POC, Other, Posture  Risk and Benefits Discussed with Patient/Caregiver/Other: yes  Patient/Caregiver Demonstrated Understanding: yes  Plan of Care Discussed and Agreed Upon: yes  Patient Response to Education: Patient/Caregiver Verbalized Understanding of Information  Goals:  - Patient will demonstrate improvement in pain by 2 level on NPRS  to be able to stand, walk and perform transitions safely and independently.  - Patient will demonstrate improvement in LEFS score by at least 9 points in order to meet MCID to be able to stand, walk and perform transitions safely and independently.  - Patient will demonstrate full knee AROM without pain or compensation to be able to stand, walk and perform transitions safely and independently.  - Patient will demonstrate at least 4+/5 hip and knee strength in all planes without pain or compensation to be able to stand, walk and perform transitions safely and independently.  - Patient will demonstrate independence with HEP to be able to stand, walk and perform transitions safely and independently.  - Patient will be able to ambulate community distances without pain or compensation to be able to stand, walk and perform transitions safely and independently.  - Patient will be able to negotiate 1  flight of stairs reciprocally without pain or compensation to be able to stand, walk and perform transitions safely and independently.  - Patient will be able to perform 10 DL squats without pain or compensation to be able to stand, walk and perform transitions safely and independently.  Treatment today:   Initail Eval: ( 56646 : 25 min  : 1 unit), There Ex: ( 32897 :  25 min  : 1 unit)  1. Initial evaluation   2. Pt ed on diagnosis, prognosis, goals of PT, expectations   3. Ther Ex:  - SAQ , 2# x 10  - LAQ, 2#, x 10  - Clamshell with band , ytb x 10 reps, hold 3s  - SLR-  BLE - 1 x 10 reps, hold 3s  - Hamstrings stretch -  BLE - 1 x 3 reps, hold 20s  4. HEP (see below) ed on normal vs abnormal response     HEP  SIDE LYING CLAMSHELL - CLAM SHELL  with band   - While lying on your side with your knees bent, raise your top knee upwards while keeping your feet in contact the entire time. Lower back down and repeat. Do not let your pelvis roll back during the lifting movement.   - Repeat 10 Times Complete 2 Sets Hold 5 Seconds Perform 2 Times a Day   QUAD SET - TOWEL UNDER KNEE - ISOMETRIC QUADS   - Place a small towel roll under your knee, tighten your top thigh muscle to press the back of your knee downward while pressing on the towel.   - Repeat 10 Times Complete 2 Sets Hold 5 Seconds Perform 2 Times a Day  STRAIGHT LEG RAISE - SLR  with ankle weights  While lying on your back, raise up your leg with a straight knee. Keep the opposite knee bent with the foot planted on the ground.   - Repeat 10 Times Complete 2 Sets Hold 5 Seconds Perform 2 Times a Day  Hamstring stretch   - In standing, bring one leg onto mat. You should feel a stretch in the back of your leg. Lean forward from your trunk if necessary to feel a stronger stretch in your leg.   - Repeat 3 Times Complete 1 Set Hold 20 Seconds Perform 1 Time a Day

## 2025-04-08 ENCOUNTER — TREATMENT (OUTPATIENT)
Dept: PHYSICAL THERAPY | Facility: CLINIC | Age: 70
End: 2025-04-08
Payer: MEDICARE

## 2025-04-08 DIAGNOSIS — M76.892 PES ANSERINUS TENDONITIS OF LEFT LOWER EXTREMITY: ICD-10-CM

## 2025-04-08 DIAGNOSIS — M17.12 PRIMARY OSTEOARTHRITIS OF LEFT KNEE: ICD-10-CM

## 2025-04-08 PROCEDURE — 97140 MANUAL THERAPY 1/> REGIONS: CPT | Mod: GP | Performed by: PHYSICAL THERAPIST

## 2025-04-08 PROCEDURE — 97110 THERAPEUTIC EXERCISES: CPT | Mod: GP | Performed by: PHYSICAL THERAPIST

## 2025-04-08 NOTE — PROGRESS NOTES
Physical Therapy Treatment      Patient Name: Jeana Gatica  MRN: [id number]  Today's Date: 2025    Time Entry: Start time: 1032            End time: 1116     Reason for Visit  Referred Dx:Primary osteoarthritis of left knee [M17.12], Pes anserinus tendonitis of left lower extremity [M76.892]   Referred By:  Abelardo Sinclair MD    Insurance Information  Visit #: 2  Approved Visits:  MED NEC   Auth required:  / NO AUTH   Insurance:M[employer] MEDICARE   Medicare Certification Date Range: 2025 to 7/3/2025    : verified with patient    Subjective    The patient states the compliance with HEP and that makes her feel sore a little after that.   Precautions  STEADI Fall Risk Score (The score of 4 or more indicates an increased risk of falling): 0  Precautions Comment: Low fall risk  Pain  At present: 1/10 (soreness pain)    Objective   Today's finding:   Posture: The patient stands with pelvic shift to R side with wt bearing ion RLE, guarded posture  Palpation:   TTP: Present med knee joint line B knees ( L>R) , L tibial tuberosity and L med tibial condyle   Spasm/Tightness: Hamstrings, piriformis, achillis and hip adductors  MMT:              Knee extension:R:4/5 L:4/5               Knee Flexion: R:4/5 L:4/5               Hip Flexion: R:4/5 L:4/5                Prone Hip Extension: R:4-/5 L:4-/5               Hip IR(90*): R:4/5 L:4/5               Hip ER(90*)R:4/5 L:4/5               Hip Abduction: R:4-/5 L:4-/5               Gluteus medius: R:3+/5 L:3+/5                Hip Adduction: R:4/5 L:4/5   Balance:  SLS: R: 9s , high guard and unsteady, L: 18s, high guard, unsteady   Treatment Performed:   Therapeutic Exercise:    Recumbent Bike x 5m  SAQ, LLE , 2# x 10, hold 3s  SLR, LLE , 2# x 10 , hold 3s  Quads set x 10 , hold 3s  Clamshell , ytb, BLE x 10, hold 10s  TG leg press , BLE x 10  TG leg press , SL, BLE  x 10  TG calf stretch , BLE x 3   TG heel raises , BLE x 10  Manual Therapy:   Patellar mob, med  glide  Piriformis stretch , LLE x 3  20s  Hamstrings stretch, LLE x 3 x 20s  Achillis stretch, LLE x 3 x 20s  Charges today:  Ther Ex: 30 m  Manual Therapy: 14 m  Assessment:  Patient presents with low intensity (1/10) pain in L knee , decreased hip and knee eccentric control, hip and knee functional strength and balance deficits. Treatment focused on quads strength outer range and challenged with ankle weights, req min direction and cuing for bm corrections. Glut strength progressed with the band, hip flexor over activation noted, req min vc for corrections, soreness reported in the hip after that. Hip flexor stretch introduced. Cont progress improving hip and knee functional strength,challenge with hip and knee eccentric control during follow up sessions. HEP updated reviewed with the patient , patient verbalized the understanding.   Plan:  The patient will benefit from skilled PT to improve hip and knee functional strength and control, which will improve functional activities, transitions, and transfers, including climbing stairs independently, pain-free, and safely.   HEP:  Piriformis stretch - Repeat 3 Times Complete 1 Set Hold 20 Seconds Perform 1 Time a Day  Hip flexor stretch - Repeat 3 Times Complete 1 Set Hold 20 Seconds Perform 1 Time a Day  Calf stretch - Repeat 3 Times Complete 1 Set Hold 20 Seconds Perform 1 Time a Day  SIDE LYING CLAMSHELL - CLAM SHELL  with band - Repeat 10 Times Complete 2 Sets Hold 5 Seconds Perform 2 Times a Day   STRAIGHT LEG RAISE - SLR  with ankle weights -Repeat 10 Times Complete 2 Sets Hold 5 Seconds Perform 2 Times a Day   Hamstring stretch - Repeat 3 Times Complete 1 Set Hold 20 Seconds Perform 1 Time a Day

## 2025-04-18 ENCOUNTER — TREATMENT (OUTPATIENT)
Dept: PHYSICAL THERAPY | Facility: CLINIC | Age: 70
End: 2025-04-18
Payer: MEDICARE

## 2025-04-18 DIAGNOSIS — M76.892 PES ANSERINUS TENDONITIS OF LEFT LOWER EXTREMITY: ICD-10-CM

## 2025-04-18 DIAGNOSIS — M17.12 PRIMARY OSTEOARTHRITIS OF LEFT KNEE: ICD-10-CM

## 2025-04-18 PROCEDURE — 97110 THERAPEUTIC EXERCISES: CPT | Mod: GP | Performed by: PHYSICAL THERAPIST

## 2025-04-18 PROCEDURE — 97112 NEUROMUSCULAR REEDUCATION: CPT | Mod: GP | Performed by: PHYSICAL THERAPIST

## 2025-04-18 NOTE — PROGRESS NOTES
"Physical Therapy Treatment      Patient Name: Jeana Gatica  MRN: [id number]  Today's Date: 2025    Time Entry: Start time: 906            End time: 950     Reason for Visit  Referred Dx:Primary osteoarthritis of left knee [M17.12], Pes anserinus tendonitis of left lower extremity [M76.892]   Referred By:  Abelardo Sinclair MD    Insurance Information  Visit #: 3  Approved Visits:  MED NEC   Auth required:  / NO AUTH   Insurance:M[employer] MEDICARE Medicare Certification Date Range: 2025 to 7/3/2025    : verified with patient    Subjective    The patient states compliance with HEP with no pain or discomfort.   Precautions  STEADI Fall Risk Score (The score of 4 or more indicates an increased risk of falling): 0  Precautions Comment: Low fall risk  Pain  At present: 0/10 (soreness pain)  Objective   Today's finding:   Posture: The patient stands with pelvic shift to R side with wt bearing ion RLE, guarded posture  Palpation:   TTP: Present med knee joint line B knees ( L>R) , L tibial tuberosity and L med tibial condyle   Spasm/Tightness: Hamstrings, piriformis, achillis and hip adductors  MMT:              Knee extension:R:4/5 L:4/5               Knee Flexion: R:4/5 L:4/5               Hip Flexion: R:4/5 L:4/5                Prone Hip Extension: R:4-/5 L:4-/5               Hip IR(90*): R:4/5 L:4/5               Hip ER(90*)R:4/5 L:4/5               Hip Abduction: R:4-/5 L:4-/5               Gluteus medius: R:3+/5 L:3+/5                Hip Adduction: R:4/5 L:4/5   Balance:  SLS: R: 9s , high guard and unsteady, L: 18s, high guard, unsteady   Treatment Performed:   Therapeutic Exercise:    Recumbent Bike x 5m  TG leg press, BLE , level 6 x 15 reps  Calf stretch, LLE x 3 x 20s  Dynamic calf strengthening on step, BLE x 10 x 5s  Hip abduction, LLE, 2# x 10 x 3s  Hip extension , LLE , 2# x 10   Step up/ down , forward , 4\" ht x 10  Step up/ down, lateral , 4' ht x 10   Neuromuscular lynda:  SLS on airex, BLE x 3 x " 20s  SL cone taps , BLE , airex x 5 x 3s  SL cone reach with hand , forward x 5 x 3s  Bosu lateral tilts x 10  Bosu fwd/ bwd x 10  Bosu cw/ ccw circles x 10  Charges today:  Ther Ex: 25 m  Neuromuscular lynda: 19 m  Assessment:  The patient presents with improved pain to no pain. Treatment began with hip abd and knee ms functional strength and eccentric control with CKC, overactive hip add and dynamic knee vagus noted, hip abd strength challenged with the weigh, pt req min directions and cuing for bm corrections. Balance progressed on SL and was challenged with airex and Bosu. High guard and unsteadiness were noted, and SBA/2-finger support was required for safety. HEP was updated and reviewed with the patient, and the patient verbalized understanding. Cont. progressing to improve balance and functional strength to achieve the established goal.   Plan:  The patient will benefit from skilled PT to improve hip and knee functional strength and control, which will improve functional activities, transitions, and transfers, including climbing stairs independently, pain-free, and safely.   HEP:  Piriformis stretch - Repeat 3 Times Complete 1 Set Hold 20 Seconds Perform 1 Time a Day  Hip flexor stretch - Repeat 3 Times Complete 1 Set Hold 20 Seconds Perform 1 Time a Day  Calf stretch - Repeat 3 Times Complete 1 Set Hold 20 Seconds Perform 1 Time a Day  SIDE LYING CLAMSHELL - CLAM SHELL  with band - Repeat 10 Times Complete 2 Sets Hold 5 Seconds Perform 2 Times a Day   STRAIGHT LEG RAISE - SLR  with ankle weights -Repeat 10 Times Complete 2 Sets Hold 5 Seconds Perform 2 Times a Day   Hamstring stretch - Repeat 3 Times Complete 1 Set Hold 20 Seconds Perform 1 Time a Day

## 2025-04-25 ENCOUNTER — TREATMENT (OUTPATIENT)
Dept: PHYSICAL THERAPY | Facility: CLINIC | Age: 70
End: 2025-04-25
Payer: MEDICARE

## 2025-04-25 DIAGNOSIS — M17.12 PRIMARY OSTEOARTHRITIS OF LEFT KNEE: ICD-10-CM

## 2025-04-25 DIAGNOSIS — M76.892 PES ANSERINUS TENDONITIS OF LEFT LOWER EXTREMITY: ICD-10-CM

## 2025-04-25 PROCEDURE — 97110 THERAPEUTIC EXERCISES: CPT | Mod: GP | Performed by: PHYSICAL THERAPIST

## 2025-04-25 PROCEDURE — 97112 NEUROMUSCULAR REEDUCATION: CPT | Mod: GP | Performed by: PHYSICAL THERAPIST

## 2025-04-25 NOTE — PROGRESS NOTES
"Physical Therapy Treatment and Discharge      Patient Name: Jeana Gatica  MRN: [id number]  Today's Date: 2025    Time Entry: Start time: 1036            End time: 1120     Reason for Visit  Referred Dx:Primary osteoarthritis of left knee [M17.12], Pes anserinus tendonitis of left lower extremity [M76.892]   Referred By:  Abelardo Sinclair MD    Insurance Information  Visit #: 3  Approved Visits:  MED NEC   Auth required:  / NO AUTH   Insurance:M[employer] MEDICARE Medicare Certification Date Range: 2025 to 7/3/2025    : verified with patient    Subjective    The patient states compliance with HEP with no pain or discomfort.   Precautions  STEADI Fall Risk Score (The score of 4 or more indicates an increased risk of falling): 0  Precautions Comment: Low fall risk  Pain  At present: 0/10 (soreness pain)  Objective   Today's finding:   Functional performance :  DL squat: steady, independent , improved control mid range, no pain  SL squat: steady and control ,increase trunk flexion noted past mid range knee flexion  MMT:              Knee extension:R:4+/5 L:4+/5               Knee Flexion: R:4+/5 L:4+/5               Hip Flexion: R:4+/5 L:4+/5                Prone Hip Extension: R:4+/5 L:4+/5               Hip IR(90*): R:4+/5 L:4+/5                Hip ER(90*)R:4+/5 L:4+/5               Hip Abduction: R:4+/5 L:4+/5               Gluteus medius: R:4/5 L:4/5               Hip Adduction: R:4/5 L:4/5   LEFS: 80/80 =100% (previous : 56/80)  Balance:  SLS: R: 30s , L: 30s  Gait: non antalgic, steady and safe  Treatment Performed:   Therapeutic Exercise:    Recumbent Bike x 5m  TG leg press, BLE , level 6 x 15 reps  TG calf stretch BLE x 3 x 20s  TG dynamic calf strengthening on step, BLE x 10 x 5s  Hip abduction, LLE, 2# x 10 x 3s  Hip extension , LLE , 2# x 10   DL squat x 10 x 3s  SL squats x 10 x 3s   Step up/ down , forward , 4\" ht x 10  Step up/ down, lateral , 4' ht x 10   Neuromuscular lynda:  SLS on airex, BLE x 3 x " 20s  SL cone taps , BLE fwd / lat, airex x 5 x 3s  SL cone reach with hand , forward x 5 x 3s  Bosu lateral tilts x 10  Bosu fwd/ bwd x 10  Bosu cw/ ccw circles x 10  Charges today:  Ther Ex: 25 m  Neuromuscular lynda: 19 m  Assessment:  The patient reports a significant improvement in pain, having experienced no pain for the past 1 to 2 weeks. She is now able to perform daily activities such as standing, walking, and climbing stairs without any discomfort. Her functional performance has improved; she can execute both double-leg (DL) and single-leg (SL) squats more steadily, although some weakness was observed in her right lower extremity (LLE). The patient was educated and provided with a home exercise program (HEP) with the explanation of importance to continue strengthening her muscles. Her functional performance was assessed and scored at 80/80 indicating clinically sig improvement. Additionally, her balance has improved, demonstrated by her ability to maintain balance for over 30 seconds on both lower extremities (BLE) without any postural deviations. The patient has achieved all established goals, had all her questions answered, and was discharged with her home exercise program today.   Plan:  Discharge upon HEP  HEP:  SL Theraband hip ext - Repeat x 10, Hold x 3s, Perform: 2 x a day   SL theraband hip abd - Repeat x 10, Hold x 3s, Perform: 2 x a day    SL - clock / star drill - Repeat x 3 , Hold x 3s, Perform: 2 x a day   Piriformis stretch - Repeat 3 Times Complete 1 Set Hold 20 Seconds Perform 1 Time a Day  Hip flexor stretch - Repeat 3 Times Complete 1 Set Hold 20 Seconds Perform 1 Time a Day  Calf stretch - Repeat 3 Times Complete 1 Set Hold 20 Seconds Perform 1 Time a Day  SIDE LYING CLAMSHELL - CLAM SHELL  with band - Repeat 10 Times Complete 2 Sets Hold 5 Seconds Perform 2 Times a Day   STRAIGHT LEG RAISE - SLR  with ankle weights -Repeat 10 Times Complete 2 Sets Hold 5 Seconds Perform 2 Times a Day    Hamstring stretch - Repeat 3 Times Complete 1 Set Hold 20 Seconds Perform 1 Time a Day

## 2025-05-15 ENCOUNTER — APPOINTMENT (OUTPATIENT)
Dept: ORTHOPEDIC SURGERY | Facility: CLINIC | Age: 70
End: 2025-05-15
Payer: MEDICARE

## 2025-05-22 ENCOUNTER — OFFICE VISIT (OUTPATIENT)
Dept: ORTHOPEDIC SURGERY | Facility: CLINIC | Age: 70
End: 2025-05-22
Payer: MEDICARE

## 2025-05-22 DIAGNOSIS — M76.892 PES ANSERINUS TENDONITIS OF LEFT LOWER EXTREMITY: Primary | ICD-10-CM

## 2025-05-22 DIAGNOSIS — M17.12 PRIMARY OSTEOARTHRITIS OF LEFT KNEE: ICD-10-CM

## 2025-05-22 PROCEDURE — 99213 OFFICE O/P EST LOW 20 MIN: CPT | Performed by: FAMILY MEDICINE

## 2025-05-22 NOTE — PROGRESS NOTES
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  I have seen her in the past for left distal radius fracture in 2023 and right distal radius fracture in 2020.    03/28/2025, she presents with her  for evaluation of left knee pain for the past 3 weeks.  She describes medial joint line pain without direct injury or trauma.  She is active exercising.  She has tried Motrin but no resolution of her symptoms.  She denies swelling or other complaints.  She has no mechanical blocking's.  She has no problems with the opposite knee.  She denies previous issues with this knee.  We agreed to treat her pain conservatively at this time with a referral to formal physical therapy and topical diclofenac.  Activity modifications were reviewed.  We will defer injections to follow-up as needed.  She will return in 6 to 8 weeks for recheck.    Today, 05/22/2025, she presents for follow up of her pes anserine tendonitis of her left knee.  She returns for follow-up of her left knee pain.  She reports significant improvement.  Physical therapy has helped greatly.  She has been using topical diclofenac also.  She denies complete resolution but it is significantly better.    She has no other complaints.     Physical Examination:     The left knee is very minimally tender at the pes anserine complex and no longer at the medial joint line.  José Miguel's is negative.  The LEFT knee is without effusion. Patella crepitus is positive. There is no tenderness to the lateral joint lines. Flexion and extension are without mechanical blocking. There is no instability with stress testing.   The right knee is nontender and stable.  Skin - no rashes, sores, or open lesions. Strength, sensory and vascular exams are otherwise normal. There is no clubbing, cyanosis or edema.  Gait is normal and tandem.    Imaging:  Radiographs of the  left knee obtained today were reviewed and revealed mild to moderate medial compartment and patellofemoral compartment arthrosis.  There are no signs of acute fractures or dislocations.    === 03/28/25 ===  XR KNEE LEFT 4+ VIEWS  - Impression -  Mild left knee osteoarthritis.  Signed by: Inderjit De La Torre 3/29/2025 10:20 AM      Visit Diagnoses   1. Pes anserinus tendonitis of left lower extremity        2. Primary osteoarthritis of left knee              Assessment and Plan:    We reviewed the exam and x-ray findings and discussed the conservative and surgical treatment options. We agreed that she has responded very well to our conservative treatment plan for her tendinitis.  She will continue with topical diclofenac and home exercises until she has complete resolution of this pain.  She understands that her knee arthritis will not completely go away but we are not a good maintenance point.  She will continue her current plan and follow-up as needed.    **This note was dictated using Dragon speech recognition software and was not corrected for spelling or grammatical errors**.    Abelardo Sinclair MD  Sports Medicine Specialist  [facility] [place]

## 2025-06-05 ENCOUNTER — APPOINTMENT (OUTPATIENT)
Dept: PRIMARY CARE | Facility: CLINIC | Age: 70
End: 2025-06-05
Payer: COMMERCIAL

## 2025-06-05 VITALS
TEMPERATURE: 97.4 F | WEIGHT: 130 LBS | HEIGHT: 68 IN | DIASTOLIC BLOOD PRESSURE: 80 MMHG | BODY MASS INDEX: 19.7 KG/M2 | SYSTOLIC BLOOD PRESSURE: 120 MMHG

## 2025-06-05 DIAGNOSIS — R73.9 HYPERGLYCEMIA: ICD-10-CM

## 2025-06-05 DIAGNOSIS — E55.9 VITAMIN D DEFICIENCY: ICD-10-CM

## 2025-06-05 DIAGNOSIS — E78.5 HYPERLIPIDEMIA, UNSPECIFIED HYPERLIPIDEMIA TYPE: ICD-10-CM

## 2025-06-05 DIAGNOSIS — Z12.12 SCREENING FOR COLORECTAL CANCER: ICD-10-CM

## 2025-06-05 DIAGNOSIS — Z23 NEED FOR VACCINATION: ICD-10-CM

## 2025-06-05 DIAGNOSIS — Z12.31 ENCOUNTER FOR SCREENING MAMMOGRAM FOR BREAST CANCER: ICD-10-CM

## 2025-06-05 DIAGNOSIS — Z12.11 SCREENING FOR COLORECTAL CANCER: ICD-10-CM

## 2025-06-05 DIAGNOSIS — Z00.00 ROUTINE GENERAL MEDICAL EXAMINATION AT HEALTH CARE FACILITY: Primary | ICD-10-CM

## 2025-06-05 DIAGNOSIS — Z78.0 ASYMPTOMATIC MENOPAUSAL STATE: ICD-10-CM

## 2025-06-05 PROCEDURE — 90677 PCV20 VACCINE IM: CPT | Performed by: FAMILY MEDICINE

## 2025-06-05 PROCEDURE — 1159F MED LIST DOCD IN RCRD: CPT | Performed by: FAMILY MEDICINE

## 2025-06-05 PROCEDURE — 3008F BODY MASS INDEX DOCD: CPT | Performed by: FAMILY MEDICINE

## 2025-06-05 PROCEDURE — 1036F TOBACCO NON-USER: CPT | Performed by: FAMILY MEDICINE

## 2025-06-05 PROCEDURE — 1123F ACP DISCUSS/DSCN MKR DOCD: CPT | Performed by: FAMILY MEDICINE

## 2025-06-05 PROCEDURE — 1170F FXNL STATUS ASSESSED: CPT | Performed by: FAMILY MEDICINE

## 2025-06-05 PROCEDURE — G0009 ADMIN PNEUMOCOCCAL VACCINE: HCPCS | Performed by: FAMILY MEDICINE

## 2025-06-05 PROCEDURE — 1160F RVW MEDS BY RX/DR IN RCRD: CPT | Performed by: FAMILY MEDICINE

## 2025-06-05 PROCEDURE — 1158F ADVNC CARE PLAN TLK DOCD: CPT | Performed by: FAMILY MEDICINE

## 2025-06-05 PROCEDURE — G0439 PPPS, SUBSEQ VISIT: HCPCS | Performed by: FAMILY MEDICINE

## 2025-06-05 PROCEDURE — 99397 PER PM REEVAL EST PAT 65+ YR: CPT | Performed by: FAMILY MEDICINE

## 2025-06-05 ASSESSMENT — ENCOUNTER SYMPTOMS
DYSURIA: 0
VOMITING: 0
BLOOD IN STOOL: 0
OCCASIONAL FEELINGS OF UNSTEADINESS: 0
WHEEZING: 0
DIARRHEA: 0
MYALGIAS: 0
HEMATURIA: 0
FREQUENCY: 0
SINUS PRESSURE: 0
SLEEP DISTURBANCE: 1
NUMBNESS: 0
WEAKNESS: 0
BACK PAIN: 0
SHORTNESS OF BREATH: 0
ROS SKIN COMMENTS: NO MOLES GROWING OR CHANGING.
HEADACHES: 0
ARTHRALGIAS: 0
COUGH: 0
CONSTIPATION: 0
ADENOPATHY: 0
WOUND: 0
NERVOUS/ANXIOUS: 0
DIZZINESS: 0
DYSPHORIC MOOD: 0
RHINORRHEA: 0
ABDOMINAL PAIN: 0
LOSS OF SENSATION IN FEET: 0
SORE THROAT: 0
FEVER: 0
NAUSEA: 0
PALPITATIONS: 0
VOICE CHANGE: 0
FATIGUE: 0
DEPRESSION: 0

## 2025-06-05 ASSESSMENT — ACTIVITIES OF DAILY LIVING (ADL)
TAKING_MEDICATION: INDEPENDENT
MANAGING_FINANCES: INDEPENDENT
DOING_HOUSEWORK: INDEPENDENT
DRESSING: INDEPENDENT
GROCERY_SHOPPING: INDEPENDENT
BATHING: INDEPENDENT

## 2025-06-05 ASSESSMENT — PATIENT HEALTH QUESTIONNAIRE - PHQ9
SUM OF ALL RESPONSES TO PHQ9 QUESTIONS 1 AND 2: 0
1. LITTLE INTEREST OR PLEASURE IN DOING THINGS: NOT AT ALL
2. FEELING DOWN, DEPRESSED OR HOPELESS: NOT AT ALL

## 2025-06-05 NOTE — PATIENT INSTRUCTIONS
I will order a mammogram and colonoscopy.  I ordered a bone density scan, which you will be due for in September.  Return in one year for a general medical exam.  Continue to maintain a healthy weight and blood pressure. Continue with regular exercise and a healthy diet.

## 2025-06-05 NOTE — PROGRESS NOTES
"Subjective   Reason for Visit: Jeana Gatica is an 69 y.o. female here for a Medicare Wellness visit.        No surgery or hospitalizations over the last year.  Medications Ordered Prior to Encounter[1]    Tobacco Use: Low Risk  (6/5/2025)    Patient History     Smoking Tobacco Use: Never     Smokeless Tobacco Use: Never     Passive Exposure: Never   No alcohol.  No drugs.  Exercises regularly.  Maintains a healthy diet.  Has advanced directives.  Full code.   POA would be  then two kids.      Reviewed all medications by prescribing practitioner or clinical pharmacist (such as prescriptions, OTCs, herbal therapies and supplements) and documented in the medical record.    HPI    Patient Care Team:  Aldo Rooney DO as PCP - General  Aldo Rooney DO as PCP - O Medicare Advantage PCP     Review of Systems   Constitutional:  Negative for fatigue and fever.   HENT:  Negative for rhinorrhea, sinus pressure, sore throat and voice change.    Respiratory:  Negative for cough, shortness of breath and wheezing.    Cardiovascular:  Negative for chest pain, palpitations and leg swelling.   Gastrointestinal:  Negative for abdominal pain, blood in stool, constipation, diarrhea, nausea and vomiting.   Genitourinary:  Negative for dysuria, frequency, hematuria and vaginal bleeding.   Musculoskeletal:  Negative for arthralgias, back pain and myalgias.   Skin:  Negative for rash and wound.        No moles growing or changing.   Neurological:  Negative for dizziness, syncope, weakness, numbness and headaches.   Hematological:  Negative for adenopathy.   Psychiatric/Behavioral:  Positive for sleep disturbance. Negative for dysphoric mood, self-injury and suicidal ideas. The patient is not nervous/anxious.        Objective   Vitals:  /80   Temp 36.3 °C (97.4 °F) (Skin)   Ht 1.727 m (5' 8\")   Wt 59 kg (130 lb)   BMI 19.77 kg/m²       Physical Exam  Vitals reviewed.   Constitutional:       General: She is " not in acute distress.     Appearance: Normal appearance. She is not ill-appearing or toxic-appearing.   HENT:      Head: Normocephalic and atraumatic.      Right Ear: Tympanic membrane, ear canal and external ear normal.      Left Ear: Tympanic membrane, ear canal and external ear normal.      Nose: Nose normal.      Mouth/Throat:      Mouth: Mucous membranes are moist.   Eyes:      Extraocular Movements: Extraocular movements intact.      Conjunctiva/sclera: Conjunctivae normal.      Pupils: Pupils are equal, round, and reactive to light.   Cardiovascular:      Rate and Rhythm: Normal rate and regular rhythm.      Heart sounds: No murmur heard.  Pulmonary:      Effort: Pulmonary effort is normal.      Breath sounds: Normal breath sounds.   Abdominal:      General: Bowel sounds are normal. There is no distension.      Palpations: Abdomen is soft. There is no mass.      Tenderness: There is no abdominal tenderness. There is no guarding or rebound.   Musculoskeletal:         General: No tenderness.      Cervical back: Neck supple.      Right lower leg: No edema.      Left lower leg: No edema.   Skin:     Coloration: Skin is not jaundiced or pale.      Findings: No rash.   Neurological:      General: No focal deficit present.      Mental Status: She is alert and oriented to person, place, and time. Mental status is at baseline.   Psychiatric:         Mood and Affect: Mood normal.         Behavior: Behavior normal.         Thought Content: Thought content normal.         Judgment: Judgment normal.         Assessment & Plan  Hyperglycemia    Orders:    CBC and Auto Differential    Hemoglobin A1C    Comprehensive Metabolic Panel    Urinalysis with Reflex Microscopic    Hyperlipidemia, unspecified hyperlipidemia type    Orders:    CBC and Auto Differential    Comprehensive Metabolic Panel    Lipid Panel    Urinalysis with Reflex Microscopic    Vitamin D deficiency    Orders:    Vitamin D 25-Hydroxy,Total (for eval of  Vitamin D levels)    Routine general medical examination at health care facility    Orders:    1 Year Follow Up In Primary Care - Wellness Exam; Future    Asymptomatic menopausal state    Orders:    XR DEXA bone density; Future    Encounter for screening mammogram for breast cancer    Orders:    BI mammo bilateral screening tomosynthesis; Future    Screening for colorectal cancer    Orders:    Colonoscopy Screening; Average Risk Patient; Future    Need for vaccination              Annual Wellness exam completed   Preventive Health history reviewed:  Labs ordered    Mammogram ordered  BMD due 9/2025.  Ordered.  Cscope done 7/2015.  Normal.  Repeat was recommended in ten years (next month).  Low dose CT chest for lung cancer screening not indicated.  Never a smoker.  Depression Screening done  Advanced Directives Discussion Completed  Cardiovascular risk discussed and if needed, lifestyle modifications recommended, including nutritional choices, exercise, and elimination of habits contributing to risk.  We agreed on a plan to reduce the current cardiovascular risk.  See ecalc ASCVD Risk  Plus for data discussed regarding risk and risk reduction opportunities.  Aspirin use is not indicated after reviewing the updated guidelines.   Vaccines:  Influenza recommended yearly in the fall.   Prevnar 20 given today.  Prevnar 13 done 2017  Pneumovax 23 done 2019  Shingrix recommended at the pharmacy.  She states she had it at [facility].  The 10-year ASCVD risk score (Alexey TALBERT, et al., 2019) is: 9.2%    Values used to calculate the score:      Age: 69 years      Sex: Female      Is Non- : No      Diabetic: No      Tobacco smoker: No      Systolic Blood Pressure: 120 mmHg      Is BP treated: No      HDL Cholesterol: 85.3 mg/dL      Total Cholesterol: 241 mg/dL         I will order a mammogram and colonoscopy.  I ordered a bone density scan, which you will be due for in September.  Return in one year  for a general medical exam.  Continue to maintain a healthy weight and blood pressure. Continue with regular exercise and a healthy diet.          [1]   Current Outpatient Medications on File Prior to Visit   Medication Sig Dispense Refill    calcium carbonate-vit D3-min (Calcium 600 + Minerals) 600 mg calcium- 200 unit tablet Take 2 tablets by mouth once every day. 180 tablet 1    cholecalciferol (Vitamin D-3) 25 MCG (1000 UT) tablet       multivitamin-Ca-iron-minerals (Tab-A-Crista Womens) 27-0.4 mg tablet Take by mouth.      [DISCONTINUED] cyanocobalamin (Vitamin B-12) 1,000 mcg tablet Take 1 tablet (1,000 mcg) by mouth once daily.       No current facility-administered medications on file prior to visit.

## 2025-06-06 LAB
25(OH)D3+25(OH)D2 SERPL-MCNC: 42 NG/ML (ref 30–100)
ALBUMIN SERPL-MCNC: 4.2 G/DL (ref 3.6–5.1)
ALP SERPL-CCNC: 74 U/L (ref 37–153)
ALT SERPL-CCNC: 19 U/L (ref 6–29)
ANION GAP SERPL CALCULATED.4IONS-SCNC: 8 MMOL/L (CALC) (ref 7–17)
APPEARANCE UR: CLEAR
AST SERPL-CCNC: 22 U/L (ref 10–35)
BASOPHILS # BLD AUTO: 60 CELLS/UL (ref 0–200)
BASOPHILS NFR BLD AUTO: 1.2 %
BILIRUB SERPL-MCNC: 0.7 MG/DL (ref 0.2–1.2)
BILIRUB UR QL STRIP: NEGATIVE
BUN SERPL-MCNC: 12 MG/DL (ref 7–25)
CALCIUM SERPL-MCNC: 9.1 MG/DL (ref 8.6–10.4)
CHLORIDE SERPL-SCNC: 105 MMOL/L (ref 98–110)
CHOLEST SERPL-MCNC: 246 MG/DL
CHOLEST/HDLC SERPL: 2.9 (CALC)
CO2 SERPL-SCNC: 28 MMOL/L (ref 20–32)
COLOR UR: YELLOW
CREAT SERPL-MCNC: 0.74 MG/DL (ref 0.6–1)
EGFRCR SERPLBLD CKD-EPI 2021: 86 ML/MIN/1.73M2
EOSINOPHIL # BLD AUTO: 160 CELLS/UL (ref 15–500)
EOSINOPHIL NFR BLD AUTO: 3.2 %
ERYTHROCYTE [DISTWIDTH] IN BLOOD BY AUTOMATED COUNT: 13 % (ref 11–15)
EST. AVERAGE GLUCOSE BLD GHB EST-MCNC: 117 MG/DL
EST. AVERAGE GLUCOSE BLD GHB EST-SCNC: 6.5 MMOL/L
GLUCOSE SERPL-MCNC: 100 MG/DL (ref 65–99)
GLUCOSE UR QL STRIP: NEGATIVE
HBA1C MFR BLD: 5.7 %
HCT VFR BLD AUTO: 43.5 % (ref 35–45)
HDLC SERPL-MCNC: 86 MG/DL
HGB BLD-MCNC: 14.2 G/DL (ref 11.7–15.5)
HGB UR QL STRIP: NEGATIVE
KETONES UR QL STRIP: NEGATIVE
LDLC SERPL CALC-MCNC: 145 MG/DL (CALC)
LEUKOCYTE ESTERASE UR QL STRIP: NEGATIVE
LYMPHOCYTES # BLD AUTO: 1470 CELLS/UL (ref 850–3900)
LYMPHOCYTES NFR BLD AUTO: 29.4 %
MCH RBC QN AUTO: 30.4 PG (ref 27–33)
MCHC RBC AUTO-ENTMCNC: 32.6 G/DL (ref 32–36)
MCV RBC AUTO: 93.1 FL (ref 80–100)
MONOCYTES # BLD AUTO: 380 CELLS/UL (ref 200–950)
MONOCYTES NFR BLD AUTO: 7.6 %
NEUTROPHILS # BLD AUTO: 2930 CELLS/UL (ref 1500–7800)
NEUTROPHILS NFR BLD AUTO: 58.6 %
NITRITE UR QL STRIP: NEGATIVE
NONHDLC SERPL-MCNC: 160 MG/DL (CALC)
PH UR STRIP: 7.5 [PH] (ref 5–8)
PLATELET # BLD AUTO: 279 THOUSAND/UL (ref 140–400)
PMV BLD REES-ECKER: 11.2 FL (ref 7.5–12.5)
POTASSIUM SERPL-SCNC: 3.8 MMOL/L (ref 3.5–5.3)
PROT SERPL-MCNC: 6.8 G/DL (ref 6.1–8.1)
PROT UR QL STRIP: NEGATIVE
RBC # BLD AUTO: 4.67 MILLION/UL (ref 3.8–5.1)
SODIUM SERPL-SCNC: 141 MMOL/L (ref 135–146)
SP GR UR STRIP: 1.01 (ref 1–1.03)
TRIGL SERPL-MCNC: 60 MG/DL
WBC # BLD AUTO: 5 THOUSAND/UL (ref 3.8–10.8)

## 2025-06-10 DIAGNOSIS — E55.9 VITAMIN D DEFICIENCY: Primary | ICD-10-CM

## 2025-06-11 RX ORDER — ERGOCALCIFEROL 1.25 MG/1
1.25 CAPSULE ORAL
Qty: 6 CAPSULE | Refills: 0 | Status: SHIPPED | OUTPATIENT
Start: 2025-06-11 | End: 2025-09-03

## 2025-08-21 ENCOUNTER — ANESTHESIA (OUTPATIENT)
Dept: OPERATING ROOM | Facility: CLINIC | Age: 70
End: 2025-08-21
Payer: MEDICARE

## 2025-08-21 ENCOUNTER — ANESTHESIA EVENT (OUTPATIENT)
Dept: OPERATING ROOM | Facility: CLINIC | Age: 70
End: 2025-08-21
Payer: MEDICARE

## 2025-08-21 ENCOUNTER — HOSPITAL ENCOUNTER (OUTPATIENT)
Dept: OPERATING ROOM | Facility: CLINIC | Age: 70
Setting detail: OUTPATIENT SURGERY
Discharge: HOME | End: 2025-08-21
Payer: MEDICARE

## 2025-08-21 VITALS
WEIGHT: 126.98 LBS | HEART RATE: 50 BPM | DIASTOLIC BLOOD PRESSURE: 79 MMHG | RESPIRATION RATE: 16 BRPM | OXYGEN SATURATION: 99 % | BODY MASS INDEX: 19.25 KG/M2 | HEIGHT: 68 IN | TEMPERATURE: 97.5 F | SYSTOLIC BLOOD PRESSURE: 153 MMHG

## 2025-08-21 DIAGNOSIS — Z12.11 SCREENING FOR COLORECTAL CANCER: ICD-10-CM

## 2025-08-21 DIAGNOSIS — Z12.12 SCREENING FOR COLORECTAL CANCER: ICD-10-CM

## 2025-08-21 PROCEDURE — 3700000002 HC GENERAL ANESTHESIA TIME - EACH INCREMENTAL 1 MINUTE: Performed by: ANESTHESIOLOGY

## 2025-08-21 PROCEDURE — 99203 OFFICE O/P NEW LOW 30 MIN: CPT | Performed by: SURGERY

## 2025-08-21 PROCEDURE — A45380 PR COLONOSCOPY,BIOPSY

## 2025-08-21 PROCEDURE — A45380 PR COLONOSCOPY,BIOPSY: Performed by: ANESTHESIOLOGY

## 2025-08-21 PROCEDURE — 3700000001 HC GENERAL ANESTHESIA TIME - INITIAL BASE CHARGE: Performed by: ANESTHESIOLOGY

## 2025-08-21 PROCEDURE — 2500000004 HC RX 250 GENERAL PHARMACY W/ HCPCS (ALT 636 FOR OP/ED): Mod: JZ,TB

## 2025-08-21 PROCEDURE — 3600000002 HC OR TIME - INITIAL BASE CHARGE - PROCEDURE LEVEL TWO: Performed by: ANESTHESIOLOGY

## 2025-08-21 PROCEDURE — 3600000007 HC OR TIME - EACH INCREMENTAL 1 MINUTE - PROCEDURE LEVEL TWO: Performed by: ANESTHESIOLOGY

## 2025-08-21 PROCEDURE — 45380 COLONOSCOPY AND BIOPSY: CPT | Performed by: SURGERY

## 2025-08-21 PROCEDURE — 7100000009 HC PHASE TWO TIME - INITIAL BASE CHARGE: Performed by: ANESTHESIOLOGY

## 2025-08-21 PROCEDURE — 2500000004 HC RX 250 GENERAL PHARMACY W/ HCPCS (ALT 636 FOR OP/ED)

## 2025-08-21 PROCEDURE — 99100 ANES PT EXTEME AGE<1 YR&>70: CPT | Performed by: ANESTHESIOLOGY

## 2025-08-21 PROCEDURE — 7100000010 HC PHASE TWO TIME - EACH INCREMENTAL 1 MINUTE: Performed by: ANESTHESIOLOGY

## 2025-08-21 RX ORDER — FENTANYL CITRATE 50 UG/ML
INJECTION, SOLUTION INTRAMUSCULAR; INTRAVENOUS AS NEEDED
Status: DISCONTINUED | OUTPATIENT
Start: 2025-08-21 | End: 2025-08-21

## 2025-08-21 RX ORDER — ONDANSETRON HYDROCHLORIDE 2 MG/ML
INJECTION, SOLUTION INTRAVENOUS AS NEEDED
Status: DISCONTINUED | OUTPATIENT
Start: 2025-08-21 | End: 2025-08-21

## 2025-08-21 RX ORDER — PROPOFOL 10 MG/ML
INJECTION, EMULSION INTRAVENOUS CONTINUOUS PRN
Status: DISCONTINUED | OUTPATIENT
Start: 2025-08-21 | End: 2025-08-21

## 2025-08-21 RX ORDER — MIDAZOLAM HYDROCHLORIDE 1 MG/ML
INJECTION, SOLUTION INTRAMUSCULAR; INTRAVENOUS AS NEEDED
Status: DISCONTINUED | OUTPATIENT
Start: 2025-08-21 | End: 2025-08-21

## 2025-08-21 RX ORDER — GLYCOPYRROLATE 0.6MG/3ML
SYRINGE (ML) INTRAVENOUS AS NEEDED
Status: DISCONTINUED | OUTPATIENT
Start: 2025-08-21 | End: 2025-08-21

## 2025-08-21 RX ADMIN — MIDAZOLAM 2 MG: 1 INJECTION INTRAMUSCULAR; INTRAVENOUS at 08:29

## 2025-08-21 RX ADMIN — FENTANYL CITRATE 100 MCG: 50 INJECTION, SOLUTION INTRAMUSCULAR; INTRAVENOUS at 08:29

## 2025-08-21 RX ADMIN — Medication 0.2 MG: at 08:43

## 2025-08-21 RX ADMIN — PROPOFOL 50 MCG/KG/MIN: 10 INJECTION, EMULSION INTRAVENOUS at 08:32

## 2025-08-21 RX ADMIN — SODIUM CHLORIDE, POTASSIUM CHLORIDE, SODIUM LACTATE AND CALCIUM CHLORIDE: 600; 310; 30; 20 INJECTION, SOLUTION INTRAVENOUS at 08:29

## 2025-08-21 RX ADMIN — ONDANSETRON 4 MG: 2 INJECTION, SOLUTION INTRAMUSCULAR; INTRAVENOUS at 08:39

## 2025-08-21 SDOH — HEALTH STABILITY: MENTAL HEALTH: CURRENT SMOKER: 0

## 2025-08-21 ASSESSMENT — PAIN SCALES - GENERAL
PAINLEVEL_OUTOF10: 0 - NO PAIN
PAIN_LEVEL: 4
PAINLEVEL_OUTOF10: 0 - NO PAIN

## 2025-08-21 ASSESSMENT — PAIN - FUNCTIONAL ASSESSMENT
PAIN_FUNCTIONAL_ASSESSMENT: 0-10

## 2025-08-22 ASSESSMENT — PAIN SCALES - GENERAL: PAINLEVEL_OUTOF10: 0 - NO PAIN

## 2025-09-02 LAB
LABORATORY COMMENT REPORT: NORMAL
PATH REPORT.FINAL DX SPEC: NORMAL
PATH REPORT.GROSS SPEC: NORMAL
PATH REPORT.TOTAL CANCER: NORMAL

## 2026-06-08 ENCOUNTER — APPOINTMENT (OUTPATIENT)
Dept: PRIMARY CARE | Facility: CLINIC | Age: 71
End: 2026-06-08
Payer: MEDICARE